# Patient Record
Sex: MALE | Race: WHITE | Employment: OTHER | URBAN - METROPOLITAN AREA
[De-identification: names, ages, dates, MRNs, and addresses within clinical notes are randomized per-mention and may not be internally consistent; named-entity substitution may affect disease eponyms.]

---

## 2017-02-14 ENCOUNTER — ANESTHESIA EVENT (OUTPATIENT)
Dept: SURGERY | Age: 82
End: 2017-02-14
Payer: MEDICARE

## 2017-02-15 ENCOUNTER — ANESTHESIA (OUTPATIENT)
Dept: SURGERY | Age: 82
End: 2017-02-15
Payer: MEDICARE

## 2017-02-15 ENCOUNTER — SURGERY (OUTPATIENT)
Age: 82
End: 2017-02-15

## 2017-02-15 ENCOUNTER — HOSPITAL ENCOUNTER (OUTPATIENT)
Age: 82
Setting detail: OUTPATIENT SURGERY
Discharge: HOME | End: 2017-02-15
Attending: UROLOGY | Admitting: UROLOGY
Payer: MEDICARE

## 2017-02-15 ENCOUNTER — APPOINTMENT (OUTPATIENT)
Dept: GENERAL RADIOLOGY | Age: 82
End: 2017-02-15
Attending: UROLOGY
Payer: MEDICARE

## 2017-02-15 VITALS
HEIGHT: 69 IN | DIASTOLIC BLOOD PRESSURE: 57 MMHG | SYSTOLIC BLOOD PRESSURE: 152 MMHG | BODY MASS INDEX: 30.45 KG/M2 | HEART RATE: 50 BPM | RESPIRATION RATE: 16 BRPM | OXYGEN SATURATION: 95 % | TEMPERATURE: 96.7 F | WEIGHT: 205.56 LBS

## 2017-02-15 PROBLEM — N20.0 RENAL CALCULUS, RIGHT: Status: ACTIVE | Noted: 2017-02-15

## 2017-02-15 LAB
ATRIAL RATE: 50 BPM
BUN BLD-MCNC: 30 MG/DL (ref 7–18)
CALCULATED P AXIS, ECG09: 47 DEGREES
CALCULATED R AXIS, ECG10: 41 DEGREES
CALCULATED T AXIS, ECG11: 21 DEGREES
CHLORIDE BLD-SCNC: 106 MMOL/L (ref 100–108)
DIAGNOSIS, 93000: NORMAL
GLUCOSE BLD STRIP.AUTO-MCNC: 106 MG/DL (ref 74–106)
HCT VFR BLD CALC: 38 % (ref 36–49)
HGB BLD-MCNC: 12.9 G/DL (ref 12–16)
P-R INTERVAL, ECG05: 180 MS
POTASSIUM BLD-SCNC: 4.5 MMOL/L (ref 3.5–5.5)
Q-T INTERVAL, ECG07: 476 MS
QRS DURATION, ECG06: 86 MS
QTC CALCULATION (BEZET), ECG08: 433 MS
SODIUM BLD-SCNC: 143 MMOL/L (ref 136–145)
VENTRICULAR RATE, ECG03: 50 BPM

## 2017-02-15 PROCEDURE — 76060000033 HC ANESTHESIA 1 TO 1.5 HR: Performed by: UROLOGY

## 2017-02-15 PROCEDURE — 74011250636 HC RX REV CODE- 250/636

## 2017-02-15 PROCEDURE — 74011250636 HC RX REV CODE- 250/636: Performed by: NURSE ANESTHETIST, CERTIFIED REGISTERED

## 2017-02-15 PROCEDURE — 74011250636 HC RX REV CODE- 250/636: Performed by: UROLOGY

## 2017-02-15 PROCEDURE — 74011250637 HC RX REV CODE- 250/637: Performed by: NURSE ANESTHETIST, CERTIFIED REGISTERED

## 2017-02-15 PROCEDURE — 93005 ELECTROCARDIOGRAM TRACING: CPT

## 2017-02-15 PROCEDURE — 74000 XR ABD (KUB): CPT

## 2017-02-15 PROCEDURE — 82947 ASSAY GLUCOSE BLOOD QUANT: CPT

## 2017-02-15 PROCEDURE — 50590 FRAGMENTING OF KIDNEY STONE: CPT | Performed by: UROLOGY

## 2017-02-15 PROCEDURE — 76210000026 HC REC RM PH II 1 TO 1.5 HR: Performed by: UROLOGY

## 2017-02-15 RX ORDER — PROPOFOL 10 MG/ML
INJECTION, EMULSION INTRAVENOUS
Status: DISCONTINUED | OUTPATIENT
Start: 2017-02-15 | End: 2017-02-15 | Stop reason: HOSPADM

## 2017-02-15 RX ORDER — CIPROFLOXACIN 2 MG/ML
400 INJECTION, SOLUTION INTRAVENOUS ONCE
Status: COMPLETED | OUTPATIENT
Start: 2017-02-15 | End: 2017-02-15

## 2017-02-15 RX ORDER — FAMOTIDINE 20 MG/1
20 TABLET, FILM COATED ORAL ONCE
Status: COMPLETED | OUTPATIENT
Start: 2017-02-16 | End: 2017-02-15

## 2017-02-15 RX ORDER — SODIUM CHLORIDE, SODIUM LACTATE, POTASSIUM CHLORIDE, CALCIUM CHLORIDE 600; 310; 30; 20 MG/100ML; MG/100ML; MG/100ML; MG/100ML
75 INJECTION, SOLUTION INTRAVENOUS CONTINUOUS
Status: DISCONTINUED | OUTPATIENT
Start: 2017-02-16 | End: 2017-02-15 | Stop reason: HOSPADM

## 2017-02-15 RX ORDER — HYDROCODONE BITARTRATE AND ACETAMINOPHEN 5; 325 MG/1; MG/1
1 TABLET ORAL
Qty: 12 TAB | Refills: 0 | Status: SHIPPED | OUTPATIENT
Start: 2017-02-15 | End: 2017-03-08

## 2017-02-15 RX ORDER — PROPOFOL 10 MG/ML
INJECTION, EMULSION INTRAVENOUS AS NEEDED
Status: DISCONTINUED | OUTPATIENT
Start: 2017-02-15 | End: 2017-02-15 | Stop reason: HOSPADM

## 2017-02-15 RX ORDER — NITROFURANTOIN 25; 75 MG/1; MG/1
100 CAPSULE ORAL 2 TIMES DAILY
Qty: 10 CAP | Refills: 0 | Status: SHIPPED | OUTPATIENT
Start: 2017-02-15 | End: 2017-02-20

## 2017-02-15 RX ORDER — DOCUSATE SODIUM 100 MG/1
100 CAPSULE, LIQUID FILLED ORAL 2 TIMES DAILY
Qty: 60 CAP | Refills: 0 | Status: SHIPPED | OUTPATIENT
Start: 2017-02-15 | End: 2017-03-08

## 2017-02-15 RX ORDER — FAMOTIDINE 20 MG/1
TABLET, FILM COATED ORAL
Status: DISCONTINUED
Start: 2017-02-15 | End: 2017-02-15 | Stop reason: HOSPADM

## 2017-02-15 RX ORDER — TRAMADOL HYDROCHLORIDE 50 MG/1
50 TABLET ORAL
Qty: 12 TAB | Refills: 0 | OUTPATIENT
Start: 2017-02-15 | End: 2017-02-15

## 2017-02-15 RX ADMIN — PROPOFOL 50 MG: 10 INJECTION, EMULSION INTRAVENOUS at 07:54

## 2017-02-15 RX ADMIN — PROPOFOL 100 MCG/KG/MIN: 10 INJECTION, EMULSION INTRAVENOUS at 07:54

## 2017-02-15 RX ADMIN — FAMOTIDINE 20 MG: 20 TABLET ORAL at 06:54

## 2017-02-15 RX ADMIN — SODIUM CHLORIDE, SODIUM LACTATE, POTASSIUM CHLORIDE, AND CALCIUM CHLORIDE 75 ML/HR: 600; 310; 30; 20 INJECTION, SOLUTION INTRAVENOUS at 07:09

## 2017-02-15 RX ADMIN — CIPROFLOXACIN 400 MG: 2 INJECTION, SOLUTION INTRAVENOUS at 07:46

## 2017-02-15 RX ADMIN — SODIUM CHLORIDE, SODIUM LACTATE, POTASSIUM CHLORIDE, AND CALCIUM CHLORIDE 75 ML/HR: 600; 310; 30; 20 INJECTION, SOLUTION INTRAVENOUS at 09:28

## 2017-02-15 NOTE — DISCHARGE INSTRUCTIONS
Lithotripsy: What to Expect at [address] is a way to treat kidney stones without surgery. It is also called extracorporeal shock wave lithotripsy, or ESWL. This treatment uses sound waves to break kidney stones into tiny pieces. These pieces can then pass out of the body in the urine. You may have a small amount of blood in your urine after this treatment. Your urine may be slightly pink or reddish. The blood in the urine often goes away after 2 days. You may have a plastic tube inside one of your ureters. Ureters are the tubes that connect the kidneys to the bladder. The plastic tube is called a stent. It takes urine from your kidney to your bladder. This lets the stone pass more easily. Your doctor may remove the stent in about a week or two. This care sheet gives you a general idea about how long it will take for you to recover. But each person recovers at a different pace. Follow the steps below to feel better as quickly as possible. How can you care for yourself at home? Activity  · Rest as much as you need to after you go home. · You may do your regular activities. But avoid hard exercise or sports for a week. Wait until there is no blood in your urine and the stent is out. Diet  · You can eat your normal diet. · Drink plenty of fluids, enough so that your urine is light yellow or clear like water. If you have kidney, heart, or liver disease and have to limit fluids, talk with your doctor before you increase the amount of fluids you drink. Medicines  · Your doctor will tell you if and when you can restart your medicines. He or she will also give you instructions about taking any new medicines. · If you take blood thinners, such as warfarin (Coumadin), clopidogrel (Plavix), or aspirin, be sure to talk to your doctor. He or she will tell you if and when to start taking those medicines again. Make sure that you understand exactly what your doctor wants you to do.   · Be safe with medicines. Read and follow all instructions on the label. ¨ If the doctor gave you a prescription medicine for pain, take it as prescribed. ¨ If you are not taking a prescription pain medicine, ask your doctor if you can take acetaminophen (Tylenol). Do not take ibuprofen (Advil, Motrin) or naproxen (Aleve), or similar medicines unless your doctor tells you to. ¨ Do not take two or more pain medicines at the same time unless the doctor told you to. Many pain medicines have acetaminophen, which is Tylenol. Too much acetaminophen (Tylenol) can be harmful. Other instructions  · Urinate through the strainer the doctor gives you. Save any stone pieces, including those that look like sand or gravel. Take these to your doctor. This will help your doctor find the cause of your stones. Follow-up care is a key part of your treatment and safety. Be sure to make and go to all appointments, and call your doctor if you are having problems. It's also a good idea to know your test results and keep a list of the medicines you take. When should you call for help? Call your doctor now or seek immediate medical care if:  · You have severe pain that does not get better after you take pain medicine. · You have severe pain when you urinate. · You have a fever over 100°F.  · You are not able to urinate within 6 to 8 hours after the procedure. · Your urine is still bright red 48 hours after the procedure. Watch closely for any changes in your health, and be sure to contact your doctor if:  · You do not get better as expected. Where can you learn more? Go to http://kris-erlinda.info/. Enter U744 in the search box to learn more about \"Lithotripsy: What to Expect at Home. \"  Current as of: November 20, 2015  Content Version: 11.1  © 5538-1543 [employer]. Care instructions adapted under license by [employer] (which disclaims liability or warranty for this information).  If you have questions about a medical condition or this instruction, always ask your healthcare professional. [address] any warranty or liability for your use of this information. DISCHARGE SUMMARY from Nurse    The following personal items are in your possession at time of discharge:    Dental Appliances: At home  Visual Aid: None     Home Medications: None  Jewelry: None  Clothing: Pants, Sweater, Undergarments, Footwear, Socks  Other Valuables: None             PATIENT INSTRUCTIONS:    After general anesthesia or intravenous sedation, for 24 hours or while taking prescription Narcotics:  · Limit your activities  · Do not drive and operate hazardous machinery  · Do not make important personal or business decisions  · Do  not drink alcoholic beverages  · If you have not urinated within 8 hours after discharge, please contact your surgeon on call. Report the following to your surgeon:  · Excessive pain, swelling, redness or odor of or around the surgical area  · Temperature over 100.5  · Nausea and vomiting lasting longer than 4 hours or if unable to take medications  · Any signs of decreased circulation or nerve impairment to extremity: change in color, persistent  numbness, tingling, coldness or increase pain  · Any questions        What to do at Home:    These are general instructions for a healthy lifestyle:    No smoking/ No tobacco products/ Avoid exposure to second hand smoke    Surgeon General's Warning:  Quitting smoking now greatly reduces serious risk to your health.     Obesity, smoking, and sedentary lifestyle greatly increases your risk for illness    A healthy diet, regular physical exercise & weight monitoring are important for maintaining a healthy lifestyle    You may be retaining fluid if you have a history of heart failure or if you experience any of the following symptoms:  Weight gain of 3 pounds or more overnight or 5 pounds in a week, increased swelling in our hands or feet or shortness of breath while lying flat in bed. Please call your doctor as soon as you notice any of these symptoms; do not wait until your next office visit. Recognize signs and symptoms of STROKE:    F-face looks uneven    A-arms unable to move or move unevenly    S-speech slurred or non-existent    T-time-call 911 as soon as signs and symptoms begin-DO NOT go       Back to bed or wait to see if you get better-TIME IS BRAIN. Warning Signs of HEART ATTACK     Call 911 if you have these symptoms:   Chest discomfort. Most heart attacks involve discomfort in the center of the chest that lasts more than a few minutes, or that goes away and comes back. It can feel like uncomfortable pressure, squeezing, fullness, or pain.  Discomfort in other areas of the upper body. Symptoms can include pain or discomfort in one or both arms, the back, neck, jaw, or stomach.  Shortness of breath with or without chest discomfort.  Other signs may include breaking out in a cold sweat, nausea, or lightheadedness. Don't wait more than five minutes to call 911 - MINUTES MATTER! Fast action can save your life. Calling 911 is almost always the fastest way to get lifesaving treatment. Emergency Medical Services staff can begin treatment when they arrive -- up to an hour sooner than if someone gets to the hospital by car. The discharge information has been reviewed with the patient. The patient verbalized understanding. Discharge medications reviewed with the patient and appropriate educational materials and side effects teaching were provided. Patient armband removed and given to patient to take home.   Patient was informed of the privacy risks if armband lost or stolen

## 2017-02-15 NOTE — ANESTHESIA POSTPROCEDURE EVALUATION
Post-Anesthesia Evaluation & Assessment    Visit Vitals    /62    Pulse (!) 52    Temp 35.9 °C (96.7 °F)    Resp 16    Ht 5' 9\" (1.753 m)    Wt 93.2 kg (205 lb 9 oz)    SpO2 96%    BMI 30.36 kg/m2       Nausea/Vomiting: no nausea    Post-operative hydration adequate.     Pain score (VAS): 4    Mental status & Level of consciousness: alert and oriented x 3    Neurological status: moves all extremities, sensation grossly intact    Pulmonary status: airway patent, no supplemental oxygen required    Complications related to anesthesia: none    Additional comments:

## 2017-02-15 NOTE — IP AVS SNAPSHOT
Donis Mccallmu 
 
 
 [address] [facility] Patient: Linda Zaragoza MRN: [id number] You are allergic to the following No active allergies Recent Documentation Height Weight BMI Smoking Status 1.753 m 93.2 kg 30.36 kg/m2 Never Smoker Emergency Contacts Name Discharge Info Relation Home Work Mobile [address] CAREGIVER [3] Daughter [21] [phone] Araseli Almeida  Spouse [3] [phone] About your hospitalization You were admitted on:  February 15, 2017 You last received care in the:  [address] You were discharged on:  February 15, 2017 Unit phone number:  [phone] Why you were hospitalized Your primary diagnosis was:  Not on File Your diagnoses also included:  Renal Calculus, Right Providers Seen During Your Hospitalizations Provider Role Specialty Primary office phone Anna Lugo MD Attending Provider Urology [phone] Your Primary Care Physician (PCP) Primary Care Physician Office Phone Office Fax [facility] [phone] Follow-up Information Follow up With Details Comments Contact Info Neri Pack MD   [address] [facility] [address] [place] [address] 
[phone] Your Appointments Thursday March 02, 2017  2:00 PM EST  
XRAY Visit with Lucy Franco [facility] [address] [place] [id number]  
[phone] Thursday March 02, 2017  2:30 PM EST Any with Zulema Faust MD  
[facility] [address] [address]  
[phone] Current Discharge Medication List  
  
START taking these medications Dose & Instructions Dispensing Information Comments Morning Noon Evening Bedtime  
 traMADol 50 mg tablet Commonly known as:  ULTRAM  
   
Your next dose is: Today, Tomorrow Other:  _________ Dose:  50 mg Take 1 Tab by mouth every six (6) hours as needed for Pain. Max Daily Amount: 200 mg. Quantity:  12 Tab Refills:  0 CONTINUE these medications which have CHANGED Dose & Instructions Dispensing Information Comments Morning Noon Evening Bedtime * nitrofurantoin (macrocrystal-monohydrate) 100 mg capsule Commonly known as:  MACROBID What changed:  Another medication with the same name was added. Make sure you understand how and when to take each. Your next dose is: Today, Tomorrow Other:  _________ Dose:  100 mg Take 1 Cap by mouth two (2) times a day for 10 days. Quantity:  20 Cap Refills:  0  
     
   
   
   
  
 * nitrofurantoin (macrocrystal-monohydrate) 100 mg capsule Commonly known as:  MACROBID What changed: You were already taking a medication with the same name, and this prescription was added. Make sure you understand how and when to take each. Your next dose is: Today, Tomorrow Other:  _________ Dose:  100 mg Take 1 Cap by mouth two (2) times a day for 5 days. Quantity:  10 Cap Refills:  0  
     
   
   
   
  
 * Notice: This list has 2 medication(s) that are the same as other medications prescribed for you. Read the directions carefully, and ask your doctor or other care provider to review them with you. CONTINUE these medications which have NOT CHANGED Dose & Instructions Dispensing Information Comments Morning Noon Evening Bedtime Aspirin EC 81 mg tablet Generic drug:  aspirin delayed-release Your next dose is: Today, Tomorrow Other:  _________ Dose:  81 mg  
81 mg. Refills:  0 LUMIGAN 0.01 % ophthalmic drops Generic drug:  bimatoprost  
   
 Your next dose is: Today, Tomorrow Other:  _________  
   
   
 instill 1 drop into both eyes at bedtime Refills:  0  
     
   
   
   
  
 metoprolol succinate 25 mg XL tablet Commonly known as:  TOPROL-XL Your next dose is: Today, Tomorrow Other:  _________  
   
   
 take 1 tablet by mouth twice a day Refills:  0 NORVASC 5 mg tablet Generic drug:  amLODIPine Your next dose is: Today, Tomorrow Other:  _________ Dose:  5 mg  
5 mg. Refills:  0  
     
   
   
   
  
 tamsulosin 0.4 mg capsule Commonly known as:  FLOMAX Your next dose is: Today, Tomorrow Other:  _________ Dose:  0.4 mg  
0.4 mg. Refills:  0 Where to Get Your Medications These medications were sent to [address] [place] [address], [address] [place] [address] Hours:  24-hours Phone:  [phone]  
  nitrofurantoin (macrocrystal-monohydrate) 100 mg capsule Information on where to get these meds will be given to you by the nurse or doctor. ! Ask your nurse or doctor about these medications  
  traMADol 50 mg tablet Discharge Instructions Lithotripsy: What to Expect at [facility] Your Recovery Lithotripsy is a way to treat kidney stones without surgery. It is also called extracorporeal shock wave lithotripsy, or ESWL. This treatment uses sound waves to break kidney stones into tiny pieces. These pieces can then pass out of the body in the urine. You may have a small amount of blood in your urine after this treatment. Your urine may be slightly pink or reddish. The blood in the urine often goes away after 2 days. You may have a plastic tube inside one of your ureters. Ureters are the tubes that connect the kidneys to the bladder.  The plastic tube is called a stent. It takes urine from your kidney to your bladder. This lets the stone pass more easily. Your doctor may remove the stent in about a week or two. This care sheet gives you a general idea about how long it will take for you to recover. But each person recovers at a different pace. Follow the steps below to feel better as quickly as possible. How can you care for yourself at home? Activity · Rest as much as you need to after you go home. · You may do your regular activities. But avoid hard exercise or sports for a week. Wait until there is no blood in your urine and the stent is out. Diet · You can eat your normal diet. · Drink plenty of fluids, enough so that your urine is light yellow or clear like water. If you have kidney, heart, or liver disease and have to limit fluids, talk with your doctor before you increase the amount of fluids you drink. Medicines · Your doctor will tell you if and when you can restart your medicines. He or she will also give you instructions about taking any new medicines. · If you take blood thinners, such as warfarin (Coumadin), clopidogrel (Plavix), or aspirin, be sure to talk to your doctor. He or she will tell you if and when to start taking those medicines again. Make sure that you understand exactly what your doctor wants you to do. · Be safe with medicines. Read and follow all instructions on the label. ¨ If the doctor gave you a prescription medicine for pain, take it as prescribed. ¨ If you are not taking a prescription pain medicine, ask your doctor if you can take acetaminophen (Tylenol). Do not take ibuprofen (Advil, Motrin) or naproxen (Aleve), or similar medicines unless your doctor tells you to. ¨ Do not take two or more pain medicines at the same time unless the doctor told you to. Many pain medicines have acetaminophen, which is Tylenol. Too much acetaminophen (Tylenol) can be harmful. Other instructions · Urinate through the strainer the doctor gives you. Save any stone pieces, including those that look like sand or gravel. Take these to your doctor. This will help your doctor find the cause of your stones. Follow-up care is a key part of your treatment and safety. Be sure to make and go to all appointments, and call your doctor if you are having problems. It's also a good idea to know your test results and keep a list of the medicines you take. When should you call for help? Call your doctor now or seek immediate medical care if: 
· You have severe pain that does not get better after you take pain medicine. · You have severe pain when you urinate. · You have a fever over 100°F. 
· You are not able to urinate within 6 to 8 hours after the procedure. · Your urine is still bright red 48 hours after the procedure. Watch closely for any changes in your health, and be sure to contact your doctor if: 
· You do not get better as expected. Where can you learn more? Go to http://kris-erlinda.info/. Enter M458 in the search box to learn more about \"Lithotripsy: What to Expect at Home. \" Current as of: November 20, 2015 Content Version: 11.1 © 5453-5928 [employer]. Care instructions adapted under license by [employer] (which disclaims liability or warranty for this information). If you have questions about a medical condition or this instruction, always ask your healthcare professional. [place] [address] any warranty or liability for your use of this information. DISCHARGE SUMMARY from Nurse The following personal items are in your possession at time of discharge: 
 
Dental Appliances: At home Visual Aid: None Home Medications: None Jewelry: None Clothing: Pants, Sweater, Undergarments, Footwear, Socks Other Valuables: None PATIENT INSTRUCTIONS: 
 
After general anesthesia or intravenous sedation, for 24 hours or while taking prescription Narcotics: · Limit your activities · Do not drive and operate hazardous machinery · Do not make important personal or business decisions · Do  not drink alcoholic beverages · If you have not urinated within 8 hours after discharge, please contact your surgeon on call. Report the following to your surgeon: 
· Excessive pain, swelling, redness or odor of or around the surgical area · Temperature over 100.5 · Nausea and vomiting lasting longer than 4 hours or if unable to take medications · Any signs of decreased circulation or nerve impairment to extremity: change in color, persistent  numbness, tingling, coldness or increase pain · Any questions What to do at Home: These are general instructions for a healthy lifestyle: No smoking/ No tobacco products/ Avoid exposure to second hand smoke Surgeon General's Warning:  Quitting smoking now greatly reduces serious risk to your health. Obesity, smoking, and sedentary lifestyle greatly increases your risk for illness A healthy diet, regular physical exercise & weight monitoring are important for maintaining a healthy lifestyle You may be retaining fluid if you have a history of heart failure or if you experience any of the following symptoms:  Weight gain of 3 pounds or more overnight or 5 pounds in a week, increased swelling in our hands or feet or shortness of breath while lying flat in bed. Please call your doctor as soon as you notice any of these symptoms; do not wait until your next office visit. Recognize signs and symptoms of STROKE: 
 
F-face looks uneven A-arms unable to move or move unevenly S-speech slurred or non-existent T-time-call 911 as soon as signs and symptoms begin-DO NOT go Back to bed or wait to see if you get better-TIME IS BRAIN. Warning Signs of HEART ATTACK Call 911 if you have these symptoms: ? Chest discomfort. Most heart attacks involve discomfort in the center of the chest that lasts more than a few minutes, or that goes away and comes back. It can feel like uncomfortable pressure, squeezing, fullness, or pain. ? Discomfort in other areas of the upper body. Symptoms can include pain or discomfort in one or both arms, the back, neck, jaw, or stomach. ? Shortness of breath with or without chest discomfort. ? Other signs may include breaking out in a cold sweat, nausea, or lightheadedness. Don't wait more than five minutes to call [address]! Fast action can save your life. Calling 911 is almost always the fastest way to get lifesaving treatment. Emergency Medical Services staff can begin treatment when they arrive  up to an hour sooner than if someone gets to the hospital by car. The discharge information has been reviewed with the patient. The patient verbalized understanding. Discharge medications reviewed with the patient and appropriate educational materials and side effects teaching were provided. Patient armband removed and given to patient to take home. Patient was informed of the privacy risks if armband lost or stolen Discharge Orders None Introducing hospitals & HEALTH SERVICES! [employer] introduces Colto patient portal. Now you can access parts of your medical record, email your doctor's office, and request medication refills online. 1. In your internet browser, go to [email or web address] 2. Click on the First Time User? Click Here link in the Sign In box. You will see the New Member Sign Up page. 3. Enter your Colto Access Code exactly as it appears below. You will not need to use this code after youve completed the sign-up process. If you do not sign up before the expiration date, you must request a new code. · Colto Access Code: [email or web address] Expires: 5/8/2017  4:53 PM 
 
 4. Enter the last four digits of your Social Security Number (xxxx) and Date of Birth (mm/dd/yyyy) as indicated and click Submit. You will be taken to the next sign-up page. 5. Create a Syncbak ID. This will be your Syncbak login ID and cannot be changed, so think of one that is secure and easy to remember. 6. Create a Syncbak password. You can change your password at any time. 7. Enter your Password Reset Question and Answer. This can be used at a later time if you forget your password. 8. Enter your e-mail address. You will receive e-mail notification when new information is available in [address] 9. Click Sign Up. You can now view and download portions of your medical record. 10. Click the Download Summary menu link to download a portable copy of your medical information. If you have questions, please visit the Frequently Asked Questions section of the Syncbak website. Remember, Syncbak is NOT to be used for urgent needs. For medical emergencies, dial 911. Now available from your iPhone and Android! General Information Please provide this summary of care documentation to your next provider. Patient Signature:  ____________________________________________________________ Date:  ____________________________________________________________  
  
Marvin Alves Provider Signature:  ____________________________________________________________ Date:  ____________________________________________________________

## 2017-02-15 NOTE — OP NOTES
Extracorporeal Shock Wave Lithotripsy Operative Note      Preoperative Diagnosis:  16 mm x 14 mm right renal pelvis  Stone Type: faintly opacified otherwise not known. Postoperative Diagnosis: n20.0  Right kidney stone    Procedure: Procedure(s):  LITHOTRIPSY EXTRACORPOREAL SHOCKWAVE ESWL rt    Surgeon(s): Doroteo Lucas MD    Anesthesia: MAC  EBL:  None  Tubes and Drains:  None  Complications:  None    I discussed with patient options of shock wave lithotripsy (ESWL) and ureteroscopy (URS), either of which would be reasonable options for the patient. Explained that ureteroscopy has higher chance of success with a single treatment relative to SWL. However, it is more invasive and it is possible that will not be able to access ureter during initial procedure. ESWL risks include anesthesia risk, bleeding/hematoma, severe infection/sepsis, injury to ureter, kidney and other organs, and potential need for repeat procedures either due to inadequate breakup of the stone or [place]. Patient desires planned ESWL. Description of Procedure: The patient was identified and surgical site verification was performed prior to obtaining consent. The patient was brought to the lithotripsy suite and transferred to the lithotripsy table. The stone was visualized with fluoroscopy and the patient was then sedated. The stone was treated with the [address] [facility]), and a  total of 2500 shocks at a maximum Energy Intensity of 5. The stone   showed apparent good fragmentation. The patient was then awakened and transferred to the recovery room. The patient will be asked to follow up with Dr Linnea Cosme in 10-14 days with a kub to determine adequacy of lithotripsy. Instructions have been given to the patient and family regarding straining urine post op to catch fragments. The patient has been advised to contact the urologist, or go to the ER if severe pain occurs.  Also to seek attention if fever or significant bleeding occur. Antibiotics with macrobid and pain meds vicodin (Half of a 5/325 tablet) have been prescribed.     2/15/2017  9:55 AM      Jose Herrera MD

## 2017-02-15 NOTE — PROGRESS NOTES
conducted a pre-surgery visit with Paulino Serrano, who is a 80 y. o.,male. The  provided the following Interventions:  Initiated a relationship of care and support. Offered prayer and assurance of continued prayers on patient's behalf. Plan:  Chaplains will continue to follow and will provide pastoral care on an as needed/requested basis.  recommends bedside caregivers page  on duty if patient shows signs of acute spiritual or emotional distress.     [address] [facility]   Staff [address] [facility]   [phone]

## 2017-02-15 NOTE — BRIEF OP NOTE
BRIEF OPERATIVE NOTE    Date of Procedure: 2/15/2017   Preoperative Diagnosis: n20.0  Right kidney stone  Postoperative Diagnosis: same  Procedure(s):  LITHOTRIPSY EXTRACORPOREAL SHOCKWAVE ESWL rt  Surgeon(s) and Role:     * Hao Miller MD - Primary          Anesthesia: MAC   Estimated Blood Loss: none  Specimens: none  Findings: 2500 shocks administered to stone a level 5. Apparent good fragmentation.   Complications: none  Implants:/Drains:  Preexisting JJ right ureteral stent    Hao Miller MD

## 2017-02-15 NOTE — H&P
2/7/2017     CC: Kidney stone      Yoselyn Hester is a 80 y.o. white male who returns for Right UPJ stone 1.5cm.   -- S/p Right JJ stent placement on 1/21/17 by Dr. Dwight Horton  Also had  urinary retention with gongora catheter placed.      >> Pt passed voiding trial, now doing well with no voiding issues. On Flomax 0.4mg daily. Denied any voiding difficulty prior to Jan 2017. Tolerating JJ stent well. No abd/flank pain. No f/c/n/v. Today's 2/7/17 KUB: Right renal pelvis stone [id number] overlying the 12th rib. Right ureteral stent in good position. Bilateral phleboliths present.      Pt's daughter present today 2/7/17     Previously seen for UTI, Urinary frequency on 11/4/15. Denies any prostate exams done in past year. Had urinary frequency 11/4/15, dropped off urine sample 11/6/15 which showed possible mild UTI. Placed on Abx and is now doing well with no urinary sx's.      1/21/17 UCx: No growth   11/6/15 Urine Cx: 20k, 3 or more orgs present   10/28/15 Creatinine 1.4      He denies hematuria, dysuria, or pain. Denies f/c/n/v. Some slowing of FOS. Nocturia 2x/night. Daytime urinary frequency q 3-4 hrs. No incontinence     1/23/17 Renal US:  Impression: Large left renal cyst.     1/21/17 CT abd pelv:  Impression:   1.  Moderate hydronephrosis and obstructive nephropathy associated with a large stone within the proximal right ureter, near the level of the UPJ.  The stone measures 1.5 x 0.9 cm transverse and approximately 1.3 cm craniocaudad.  Urologic consultation is recommended. 2.  Stable large left renal cyst.  3.  Partially calcified pulmonary nodule at the lateral left lung base has minimally increased since the comparison of approximately 3 years ago.  Findings most compatible with a hamartoma. 4.  Diverticulosis. 5.  Prostate hypertrophy.   6.  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     Has BPH -- s/p TURP 3/31/14      9/29/14 s/p laser lithotripsy of a Bladder stone 2 cm.  -- CaPhos 70%, MagAmmoniumPhos 30%  Prior to bladder stone surgery, had nocturia 5x/night and some increased daytime urinary frequency q 1-2 hr and dribbling/slow stream.  8/20/14 Office cystoscopy found a Bladder stone ~1cm stuck in prostatic urethra, unable to pass -- pushed stone back into bladder.      11/11/15 Renal Duplex Scan: Left renal cyst 2.67x4.3 cm. No renal artery stenosis     PVR 11/12/15 19cc  PVR 26cc 5/6/15           PSA DIAGNOSTIC    Date  Value  Range  Status    9/7/11 3.6       3/30/2011  5.170*  - (NG/ML)  Final       UVA includes Hypgonad and General Values 5/6/2015 8/20/2014 8/6/2014   AUA Score 7 16 13      PMH/PSH/SocHx/FamHx/Meds & allergies reviewed.         Past Medical History   Diagnosis Date    Bladder stone      Blindness of left eye      BPH (benign prostatic hyperplasia)      Chronic kidney disease, stage III (moderate)      GERD (gastroesophageal reflux disease)      HTN (hypertension)      Retention of urine               Past Surgical History   Procedure Laterality Date    Hx cataract removal        Hx urological   03/31/2014       [facility]-Cystoscopy with transurethral resection of prostate, Dr. Bailey Vargas Hx urological   09/29/2014       [facility]-Cystoscopy with laser lithotripsy of bladder stone,Dr Bailey Vargas Hx urological   01/21/2017       [facility]-Cystoscopy,Right retrograde pyelography,Placement of right double-J stent, Dr Jennifer Valdivia      Social History            Social History    Marital status:        Spouse name: N/A    Number of children: N/A    Years of education: N/A      Occupational History    Not on file.           Social History Main Topics    Smoking status: Never Smoker    Smokeless tobacco: Never Used    Alcohol use No    Drug use: No    Sexual activity: Not on file           Other Topics Concern    Not on file      Social History Narrative            Family History   Problem Relation Age of Onset    Cancer Daughter        No Known Allergies         Current Outpatient Prescriptions on File Prior to Visit   Medication Sig Dispense Refill    finasteride (PROSCAR) 5 mg tablet Take 1 Tab by mouth daily. 90 Tab 3      No current facility-administered medications on file prior to visit.          Urinalysis:         Results for orders placed or performed in visit on 02/07/17   AMB POC URINALYSIS DIP STICK AUTO W/O MICRO   Result Value Ref Range     Color (UA POC) Yellow       Clarity (UA POC) Clear       Glucose (UA POC) Negative Negative     Bilirubin (UA POC) Negative Negative     Ketones (UA POC) Negative Negative     Specific gravity (UA POC) 1.025 1.001 - 1.035     Blood (UA POC) 2+ Negative     pH (UA POC) 6.5 4.6 - 8.0     Protein (UA POC) Trace Negative mg/dL     Urobilinogen (UA POC) 0.2 mg/dL 0.2 - 1     Nitrites (UA POC) Positive Negative     Leukocyte esterase (UA POC) 1+ Negative         Physical Exam:        Visit Vitals    /56    Ht 5' 9\" (1.753 m)    Wt 205 lb (93 kg)    BMI 30.27 kg/m2      WDWN mod obese WM in NAD, in wheelchair  HEENT: NCAT, EOMI   Neck: symmetrical  Chest: normal respiratory effort  CV: RRR  Abdomen: Soft NDNT, no CVAT  KYM: Patient declined   Extremities: No c/c/e   Neuro: A&O x 3     Review of Systems  Constitutional: Fever: No  Skin: Rash: No  HEENT: Hearing difficulty: No  Eyes: Blurred vision: No  Cardiovascular: Chest pain: No  Respiratory: Shortness of breath: No  Gastrointestinal: Nausea/vomiting: No  Musculoskeletal: Back pain: No  Neurological: Weakness: No  Psychological: Memory loss: No  Comments/additional findings:      Assessment:  1. Right UPJ stone 1.5cm.   -- S/p Right JJ stent placement on 1/21/17 by Dr. José Luis Burciaga  2. BPH, Nocturia  -- S/p TURP 3/31/14; 9/29/14 s/p laser lithotripsy of a Bladder stone 2 cm   3. Bacteriuria, no UTI sx's. Urine now clear. 4. Urinary frequency, resolved  5. Left renal cyst -- pt reassured, no intervention needed.      Plan:  1.  Right ESWL   2. KUB today, reviewed with patient    For ESWL:   To decrease risk of bleeding, Need to be off all Aspirin, OTC pain meds (except Tylenol), Fish oil and OTC supplements for at least 10 days prior to surgery. Failure to do this may result in cancellation of the ESWL. Risks/Benefits, and details discussed, including risks of bleeding, infection, incomplete stone fragmentation that may require a secondary stone surgery, risk of anesthesia (CV-pulmonary event, death).     After ESWL, instructed pt to drink lots of water to increase passage of stone fragments (to catch and bring in stone fragments for analysis). Will f/u 1-2 weeks after ESWL and get a repeat KUB to assess for residual stones.      All questions were answered. Patient verbalized understanding and desires to proceed with ESWL.      Patient's BMI is out of the normal parameters. Information about BMI was given to the patient.     Aleisha Morel MD

## 2017-02-15 NOTE — ANESTHESIA PREPROCEDURE EVALUATION
Anesthetic History   No history of anesthetic complications            Review of Systems / Medical History  Patient summary reviewed and pertinent labs reviewed    Pulmonary  Within defined limits                 Neuro/Psych       CVA  TIA     Cardiovascular    Hypertension: well controlled                Comments: Pt denies recent CP   GI/Hepatic/Renal     GERD: well controlled           Endo/Other  Within defined limits           Other Findings   Comments: Current Smoker? NO       Elective Surgery? Yes       Abstained from smoking 24 hours prior to anesthesia? N/A    Risk Factors for Postoperative nausea/vomiting:       History of postoperative nausea/vomiting? NO       Female? NO       Motion sickness? NO       Intended opioid administration for postoperative analgesia? NO           Physical Exam    Airway  Mallampati: II  TM Distance: 4 - 6 cm  Neck ROM: normal range of motion   Mouth opening: Normal     Cardiovascular  Regular rate and rhythm,  S1 and S2 normal,  no murmur, click, rub, or gallop             Dental  No notable dental hx    Comments: Missing incisor.      Pulmonary                 Abdominal  Abdominal exam normal       Other Findings            Anesthetic Plan    ASA: 3  Anesthesia type: MAC            Anesthetic plan and risks discussed with: Patient